# Patient Record
Sex: FEMALE | Race: WHITE | Employment: FULL TIME | URBAN - METROPOLITAN AREA
[De-identification: names, ages, dates, MRNs, and addresses within clinical notes are randomized per-mention and may not be internally consistent; named-entity substitution may affect disease eponyms.]

---

## 2016-08-02 LAB
CHLAMYDIA, EXTERNAL: NORMAL
HBSAG, EXTERNAL: NORMAL
HIV, EXTERNAL: NON REACTIVE
N. GONORRHEA, EXTERNAL: NORMAL
RUBELLA, EXTERNAL: NORMAL
TYPE, ABO & RH, EXTERNAL: NORMAL

## 2016-12-19 LAB
ANTIBODY SCREEN, EXTERNAL: NORMAL
T. PALLIDUM, EXTERNAL: NORMAL

## 2017-02-13 LAB — GRBS, EXTERNAL: NORMAL

## 2017-03-08 ENCOUNTER — HOSPITAL ENCOUNTER (INPATIENT)
Age: 32
LOS: 2 days | Discharge: HOME | End: 2017-03-10
Attending: OBSTETRICS & GYNECOLOGY | Admitting: OBSTETRICS & GYNECOLOGY
Payer: COMMERCIAL

## 2017-03-08 PROBLEM — Z34.90 PREGNANCY: Status: ACTIVE | Noted: 2017-03-08

## 2017-03-08 LAB
ERYTHROCYTE [DISTWIDTH] IN BLOOD BY AUTOMATED COUNT: 12.9 % (ref 11.5–14.5)
HCT VFR BLD AUTO: 39 % (ref 35–47)
HGB BLD-MCNC: 13.7 G/DL (ref 11.5–16)
MCH RBC QN AUTO: 30.2 PG (ref 26–34)
MCHC RBC AUTO-ENTMCNC: 35.1 G/DL (ref 30–36.5)
MCV RBC AUTO: 86.1 FL (ref 80–99)
PLATELET # BLD AUTO: 188 K/UL (ref 150–400)
RBC # BLD AUTO: 4.53 M/UL (ref 3.8–5.2)
WBC # BLD AUTO: 15.8 K/UL (ref 3.6–11)

## 2017-03-08 PROCEDURE — 74011250636 HC RX REV CODE- 250/636: Performed by: OBSTETRICS & GYNECOLOGY

## 2017-03-08 PROCEDURE — 75410000003 HC RECOV DEL/VAG/CSECN EA 0.5 HR

## 2017-03-08 PROCEDURE — 75410000000 HC DELIVERY VAGINAL/SINGLE

## 2017-03-08 PROCEDURE — 0UQGXZZ REPAIR VAGINA, EXTERNAL APPROACH: ICD-10-PCS | Performed by: OBSTETRICS & GYNECOLOGY

## 2017-03-08 PROCEDURE — 75410000002 HC LABOR FEE PER 1 HR

## 2017-03-08 PROCEDURE — 36415 COLL VENOUS BLD VENIPUNCTURE: CPT | Performed by: OBSTETRICS & GYNECOLOGY

## 2017-03-08 PROCEDURE — 0UQMXZZ REPAIR VULVA, EXTERNAL APPROACH: ICD-10-PCS | Performed by: OBSTETRICS & GYNECOLOGY

## 2017-03-08 PROCEDURE — 65410000002 HC RM PRIVATE OB

## 2017-03-08 PROCEDURE — 85027 COMPLETE CBC AUTOMATED: CPT | Performed by: OBSTETRICS & GYNECOLOGY

## 2017-03-08 PROCEDURE — 74011250637 HC RX REV CODE- 250/637: Performed by: OBSTETRICS & GYNECOLOGY

## 2017-03-08 PROCEDURE — 4A0HXCZ MEASUREMENT OF PRODUCTS OF CONCEPTION, CARDIAC RATE, EXTERNAL APPROACH: ICD-10-PCS | Performed by: OBSTETRICS & GYNECOLOGY

## 2017-03-08 PROCEDURE — 74011250636 HC RX REV CODE- 250/636

## 2017-03-08 PROCEDURE — 74011000258 HC RX REV CODE- 258: Performed by: OBSTETRICS & GYNECOLOGY

## 2017-03-08 RX ORDER — IBUPROFEN 400 MG/1
800 TABLET ORAL EVERY 8 HOURS
Status: DISCONTINUED | OUTPATIENT
Start: 2017-03-08 | End: 2017-03-10 | Stop reason: HOSPADM

## 2017-03-08 RX ORDER — ACETAMINOPHEN 325 MG/1
650 TABLET ORAL
Status: DISCONTINUED | OUTPATIENT
Start: 2017-03-08 | End: 2017-03-10 | Stop reason: HOSPADM

## 2017-03-08 RX ORDER — HYDROCORTISONE ACETATE PRAMOXINE HCL 2.5; 1 G/100G; G/100G
CREAM TOPICAL AS NEEDED
Status: DISCONTINUED | OUTPATIENT
Start: 2017-03-08 | End: 2017-03-10 | Stop reason: HOSPADM

## 2017-03-08 RX ORDER — SODIUM CHLORIDE, SODIUM LACTATE, POTASSIUM CHLORIDE, CALCIUM CHLORIDE 600; 310; 30; 20 MG/100ML; MG/100ML; MG/100ML; MG/100ML
125 INJECTION, SOLUTION INTRAVENOUS CONTINUOUS
Status: DISCONTINUED | OUTPATIENT
Start: 2017-03-08 | End: 2017-03-09

## 2017-03-08 RX ORDER — OXYTOCIN/RINGER'S LACTATE 20/1000 ML
125-500 PLASTIC BAG, INJECTION (ML) INTRAVENOUS ONCE
Status: ACTIVE | OUTPATIENT
Start: 2017-03-08 | End: 2017-03-09

## 2017-03-08 RX ORDER — NALOXONE HYDROCHLORIDE 0.4 MG/ML
0.4 INJECTION, SOLUTION INTRAMUSCULAR; INTRAVENOUS; SUBCUTANEOUS AS NEEDED
Status: DISCONTINUED | OUTPATIENT
Start: 2017-03-08 | End: 2017-03-10 | Stop reason: HOSPADM

## 2017-03-08 RX ORDER — OXYTOCIN/RINGER'S LACTATE 20/1000 ML
PLASTIC BAG, INJECTION (ML) INTRAVENOUS
Status: COMPLETED
Start: 2017-03-08 | End: 2017-03-08

## 2017-03-08 RX ORDER — OXYCODONE AND ACETAMINOPHEN 5; 325 MG/1; MG/1
1 TABLET ORAL
Status: DISCONTINUED | OUTPATIENT
Start: 2017-03-08 | End: 2017-03-10 | Stop reason: HOSPADM

## 2017-03-08 RX ORDER — LIDOCAINE HYDROCHLORIDE 10 MG/ML
INJECTION, SOLUTION EPIDURAL; INFILTRATION; INTRACAUDAL; PERINEURAL
Status: DISPENSED
Start: 2017-03-08 | End: 2017-03-09

## 2017-03-08 RX ORDER — PENICILLIN G POTASSIUM 5000000 [IU]/1
INJECTION, POWDER, FOR SOLUTION INTRAMUSCULAR; INTRAVENOUS
Status: DISPENSED
Start: 2017-03-08 | End: 2017-03-08

## 2017-03-08 RX ADMIN — IBUPROFEN 800 MG: 400 TABLET, FILM COATED ORAL at 21:42

## 2017-03-08 RX ADMIN — IBUPROFEN 800 MG: 400 TABLET, FILM COATED ORAL at 13:39

## 2017-03-08 RX ADMIN — SODIUM CHLORIDE, SODIUM LACTATE, POTASSIUM CHLORIDE, AND CALCIUM CHLORIDE 125 ML/HR: 600; 310; 30; 20 INJECTION, SOLUTION INTRAVENOUS at 09:21

## 2017-03-08 RX ADMIN — SODIUM CHLORIDE 5 MILLION UNITS: 900 INJECTION, SOLUTION INTRAVENOUS at 09:21

## 2017-03-08 RX ADMIN — Medication: at 11:58

## 2017-03-08 NOTE — IP AVS SNAPSHOT
[address] [facility] 
[phone] Patient: Kinjal Ruiz MRN: [id number] :1985 You are allergic to the following Allergen Reactions Fruit Flavor Unknown (comments) Immunizations Administered for This Admission Name Date MMR 3/10/2017 Recent Documentation Height Weight Breastfeeding? BMI OB Status Smoking Status 1.753 m 85.3 kg Yes 27.76 kg/m2 Recent pregnancy Never Smoker Unresulted Labs Order Current Status SAMPLE TO BLOOD BANK In process Emergency Contacts Name Discharge Info Relation Home Work Mobile AustinYe DISCHARGE CAREGIVER [3] Spouse [3] [phone] About your hospitalization You were admitted on:  2017 You last received care in the:  [facility] You were discharged on:  March 10, 2017 Unit phone number:  [phone] Why you were hospitalized Your primary diagnosis was:  Not on File Your diagnoses also included:  Pregnancy Providers Seen During Your Hospitalizations Provider Role Specialty Primary office phone Akua Viveros MD Attending Provider Obstetrics & Gynecology [phone] Your Primary Care Physician (PCP) Primary Care Physician Office Phone Office Fax NONE ** None ** ** None ** Follow-up Information Follow up With Details Comments Contact Info None   None (395) Patient stated that they have no PCP Akua Viveros MD Go in 6 weeks for postpartum follow up  [address] [facility] 
[phone] Current Discharge Medication List  
  
CONTINUE these medications which have NOT CHANGED Dose & Instructions Dispensing Information Comments Morning Noon Evening Bedtime Cetirizine 10 mg Cap Your next dose is: Today, Tomorrow Other:  _________ Take  by mouth. Refills:  0  
     
   
   
   
  
 ibuprofen 600 mg tablet Commonly known as:  MOTRIN Your next dose is: Today, Tomorrow Other:  _________ Dose:  600 mg Take 1 Tab by mouth every eight (8) hours as needed for Pain. Quantity:  20 Tab Refills:  0 PRENATAL DHA+COMPLETE PRENATAL -300 mg-mcg-mg Cmpk Generic drug:  PNV no.24-iron-folic acid-dha Your next dose is: Today, Tomorrow Other:  _________ Take  by mouth. Refills:  0 STOP taking these medications HYDROcodone-acetaminophen 5-325 mg per tablet Commonly known as:  Nahid Larry Discharge Instructions POST DELIVERY DISCHARGE INSTRUCTIONS Name: Paloma Kelsey YOB: 1985 Primary Diagnosis: Active Problems: * No active hospital problems. * General:  
 
Diet/Diet Restrictions: 
· Eight 8-ounce glasses of fluid daily (water, juices); avoid excessive caffeine intake. · Meals/snacks as desired which are high in fiber and carbohydrates and low in fat and cholesterol. Medications:  
 
 
 
Physical Activity / Restrictions / Safety: · Avoid heavy lifting, no more that 8 lbs. For 2-3 weeks;  
· Limit use of stairs to 2 times daily for the first week home. · No driving for one week. · Avoid intercourse 4-6 weeks, no douching or tampon use. · Check with obstetrician before starting or resuming an exercise program.   
 
Discharge Instructions/Special Treatment/Home Care Needs:  
 
· Continue prenatal vitamins. · Continue to use squirt bottle with warm water on your episiotomy after each bathroom use until bleeding stops. Call your doctor for the following: · Fever over 101 degrees by mouth. · Vaginal bleeding heavier than a normal menstrual period or clots larger than a golf ball.  
· Red streaks or increased swelling of legs, painful red streaks on your breast. 
 · Painful urination, constipation and increased pain · If you feel extremely anxious or overwhelmed. · If you have thoughts of harming yourself and/or your baby. Pain Management:  
 
· Take Acetaminophen (Tylenol) or Ibuprofen (Advil, Motrin), as directed for pain. · Use a warm Sitz bath 3 times daily to relieve episiotomy or hemorrhoidal discomfort. · For hemorrhoidal discomfort, use Tucks and Anusol cream as needed and directed. Follow-Up Care:  
 
Appointment with MD: Follow-up Appointments Procedures  FOLLOW UP VISIT Appointment in: 6 Weeks Standing Status:   Standing Number of Occurrences:   1 Order Specific Question:   Appointment in Answer:   6 Weeks Telephone number: [phone] Signed By: Bonifacio Klein MD                                                                                                   Date: 3/10/2017 Time: 10:20 AM 
 
 
Discharge Orders None zoidu Announcement We are excited to announce that we are making your provider's discharge notes available to you in zoidu. You will see these notes when they are completed and signed by the physician that discharged you from your recent hospital stay. If you have any questions or concerns about any information you see in zoidu, please call the Health Information Department where you were seen or reach out to your Primary Care Provider for more information about your plan of care. Introducing [facility]! Sophia Kay introduces zoidu patient portal. Now you can access parts of your medical record, email your doctor's office, and request medication refills online. 1. In your internet browser, go to [email or web address] 2. Click on the First Time User? Click Here link in the Sign In box. You will see the New Member Sign Up page. 3. Enter your zoidu Access Code exactly as it appears below.  You will not need to use this code after youve completed the sign-up process. If you do not sign up before the expiration date, you must request a new code. · [facility] Access Code: [email or web address] Expires: 4/25/2017 10:09 AM 
 
4. Enter the last four digits of your Social Security Number (xxxx) and Date of Birth (mm/dd/yyyy) as indicated and click Submit. You will be taken to the next sign-up page. 5. Create a [facility] ID. This will be your [facility] login ID and cannot be changed, so think of one that is secure and easy to remember. 6. Create a [facility] password. You can change your password at any time. 7. Enter your Password Reset Question and Answer. This can be used at a later time if you forget your password. 8. Enter your e-mail address. You will receive e-mail notification when new information is available in [address] 9. Click Sign Up. You can now view and download portions of your medical record. 10. Click the Download Summary menu link to download a portable copy of your medical information. If you have questions, please visit the Frequently Asked Questions section of the [facility] website. Remember, [facility] is NOT to be used for urgent needs. For medical emergencies, dial 911. Now available from your iPhone and Android! General Information Please provide this summary of care documentation to your next provider. Patient Signature:  ____________________________________________________________ Date:  ____________________________________________________________  
  
Brooke Polo Provider Signature:  ____________________________________________________________ Date:  ____________________________________________________________

## 2017-03-08 NOTE — LACTATION NOTE
This note was copied from a baby's chart. INfant fed after birth at 80 and attempted again at 0. Mom resting now and infant going to nursery for exam. Encouraged responding to feeding cues and well as waking every 2 to 3 hours if sleepy. Parents attended breastfeeding class. Initiated breastfeeding log and explained to parents. Encouraged asking for assistance as needed.     1650. Infant went to breast for sleepy attempt. Tried both cradle and football positions. Infant got into deep latch in football position but too sleepy to suckle. Parents now resting and will try again at next fed. Pump set up in room if needed for sleepiness. Infant went to breast at 1900 but very sleepy and had extra large emesis of mucus and colsotrum. Mom pumped one cc which infant took after settles but too sleepy to go to breast. LATCh score was 6. Encouraged pumping prior to next feed and having assitance from nurse. Let Cheikh Warner Rn know about emesis and need for observation for spittiness. Parents have bulb syringe in crib and understand to call emergency bell if infant gagging and choking. Infant ot go to nursery if parents sleeping and discussed that with Fidel Beckett RN. Infant now resting with Mom .

## 2017-03-08 NOTE — PROGRESS NOTES
6672: Dr. Cami Romano in room, SVE done.  Dr. Cami Romano aware of PAC's.       03/08/17 8434   Cervical Exam   Dilation (cm) 4   Eff 100 %   Station -1

## 2017-03-09 PROBLEM — Z34.90 PREGNANCY: Status: RESOLVED | Noted: 2017-03-08 | Resolved: 2017-03-09

## 2017-03-09 PROCEDURE — 74011250637 HC RX REV CODE- 250/637: Performed by: OBSTETRICS & GYNECOLOGY

## 2017-03-09 PROCEDURE — 65410000002 HC RM PRIVATE OB

## 2017-03-09 RX ADMIN — IBUPROFEN 800 MG: 400 TABLET, FILM COATED ORAL at 21:36

## 2017-03-09 RX ADMIN — IBUPROFEN 800 MG: 400 TABLET, FILM COATED ORAL at 06:48

## 2017-03-09 RX ADMIN — IBUPROFEN 800 MG: 400 TABLET, FILM COATED ORAL at 14:06

## 2017-03-09 NOTE — PROGRESS NOTES
Post-Partum Day Number 1 Progress Note    Caitlyn Avila     Assessment: Doing well, post partum day 1    Plan:  1. Continue routine postpartum and perineal care as well as maternal education. 2. N/A     Information for the patient's :  Salas Ontiveros [[id number]]   Vaginal, Spontaneous Delivery   Patient doing well without significant complaint. Voiding without difficulty, normal lochia. Vitals:  Visit Vitals    /72 (BP 1 Location: Right arm, BP Patient Position: Sitting)    Pulse 72    Temp 97.9 °F (36.6 °C)    Resp 16    Ht 5' 9\" (1.753 m)    Wt 85.3 kg (188 lb)    LMP 2016 (Exact Date)    SpO2 99%    Breastfeeding Yes    BMI 27.76 kg/m2     Temp (24hrs), Av.3 °F (36.8 °C), Min:97.9 °F (36.6 °C), Max:98.8 °F (37.1 °C)        Exam:   Patient without distress. Abdomen soft, fundus firm, nontender                Perineum with normal lochia noted. Lower extremities are negative for swelling, cords or tenderness.     Labs:     Lab Results   Component Value Date/Time    WBC 15.8 2017 09:20 AM    HGB 13.7 2017 09:20 AM    HCT 39.0 2017 09:20 AM    PLATELET 293  09:20 AM       Recent Results (from the past 24 hour(s))   CBC W/O DIFF    Collection Time: 17  9:20 AM   Result Value Ref Range    WBC 15.8 (H) 3.6 - 11.0 K/uL    RBC 4.53 3.80 - 5.20 M/uL    HGB 13.7 11.5 - 16.0 g/dL    HCT 39.0 35.0 - 47.0 %    MCV 86.1 80.0 - 99.0 FL    MCH 30.2 26.0 - 34.0 PG    MCHC 35.1 30.0 - 36.5 g/dL    RDW 12.9 11.5 - 14.5 %    PLATELET 035 265 - 287 K/uL

## 2017-03-09 NOTE — PROGRESS NOTES
Bedside and Verbal shift change report given to SEPIDEH Whittington (oncoming nurse) by Sean Giordano RN (offgoing nurse). Report included the following information SBAR, Intake/Output and MAR.

## 2017-03-09 NOTE — LACTATION NOTE
This note was copied from a baby's chart. Infant  5 times for short feeds. Initial feed was 15 minutes followed by a sleepy feeds of 2 minutes. Infant also has 4 attempts at feeding but too sleepy to wake. Mom is pumping and got 1 ml and 3 ml's. Two LATCH scores of 6 and 7 were noted. Weight loss is 5%. Plan is to continue pumping and waking and giving EBM obtained. Infant voided twice and stooled 5 times since birth in 22 hours. Infant went to breast at  for 25 minutes. Woke well with deeper latching. Encouraged continued pumping for sleepiness and 5% weight loss after sleepiness of first day.

## 2017-03-09 NOTE — ROUTINE PROCESS
Bedside shift change report given to MOON Perez RN (oncoming nurse) by SEPIDEH Auguste RN (offgoing nurse). Report included the following information SBAR.

## 2017-03-09 NOTE — L&D DELIVERY NOTE
Delivery Summary  Patient: Sean Quintana             Circumcision:   NA-female  Additional Delivery Comments - . Lidocaine injected into perineum during pushing and additional for repair. Information for the patient's :  Lio Bentley [[id number]]       Labor Events:    Labor: No   Rupture Date: 3/8/2017   Rupture Time: 7:30 AM   Rupture Type SROM   Amniotic Fluid Volume:  Moderate    Amniotic Fluid Description: Clear     Induction: None       Augmentation: None   Labor Events: None     Cervical Ripening:     None     Delivery Events:  Episiotomy: None   Laceration(s): Vaginal;Right periurethral     Repaired: Yes    Number of Repair Packets: 1   Suture Type and Size: Vicryl 3-0     Estimated Blood Loss (ml): 200ml       Delivery Date: 3/8/2017    Delivery Time: 11:53 AM  Delivery Type: Vaginal, Spontaneous Delivery  Sex:  Female     Gestational Age: 38w3d   Delivery Clinician:  Morgan Mejia  Living Status: Yes   Delivery Location: L&D            APGARS  One minute Five minutes Ten minutes   Skin color: 1   1        Heart rate: 2   2        Grimace: 2   2        Muscle tone: 2   2        Breathin   2        Totals: 9   9            Presentation: Vertex    Position:        Resuscitation Method:  None     Meconium Stained: None      Cord Vessels: 3 Vessels      Cord Events:    Cord Blood Sent?:  No    Blood Gases Sent?:  No    Placenta:  Date/Time: 3/8 11:58 AM  Removal: Spontaneous      Appearance: Intact      Measurements:  Birth Weight: 2.995 kg      Birth Length: 52.1 cm      Head Circumference: 33.5 cm      Chest Circumference: 29 cm     Abdominal Girth: 30.5 cm    Other Providers:   CANDICE SMITH;AMANDA PIERSON;BOB TARIQ;;;;;;;;MELANY GIFFORD;GEORGETTE PENA, Obstetrician;Primary Nurse;Primary  Nurse;Nicu Nurse;Neonatologist;Anesthesiologist;Crna;Nurse Practitioner;Midwife;Nursery Nurse           Cord pH:  none    Episiotomy: None Laceration(s): Vaginal;Right periurethral      Estimated Blood Loss (ml): 200    Labor Events  Method: None       Augmentation: None   Cervical Ripening:       None        Operative Vaginal Delivery - none    Group B Strep:   Lab Results   Component Value Date/Time    GrBStrep, External pos 2017     Information for the patient's :  Nicola Gamino [[id number]]   No results found for: ABORH, PCTABR, PCTDIG, BILI, ABORHEXT, ABORH    No results found for: APH, APCO2, APO2, AHCO3, ABEC, ABDC, O2ST, EPHV, PCO2V, PO2V, HCO3V, EBEV, EBDV, SITE, RSCOM

## 2017-03-09 NOTE — PROGRESS NOTES
Spiritual Care Assessment/Progress Notes    Mariel Berumen [id number]  [id number]    1985  36 y.o.  female    Patient Telephone Number: [phone] (home)   Latter day Affiliation: Kuldeep Almodovar   Language: English   Extended Emergency Contact Information  Primary Emergency Contact: [address] Phone: [phone]  Relation: Spouse   There are no active problems to display for this patient. Date: 3/9/2017       Level of Latter day/Spiritual Activity:  [x]         Involved in christine tradition/spiritual practice    []         Not involved in christine tradition/spiritual practice  [x]         Spiritually oriented    []         Claims no spiritual orientation    []         seeking spiritual identity  []         Feels alienated from Congregation practice/tradition  []         Feels angry about Congregation practice/tradition  [x]         Spirituality/Congregation tradition is a resource for coping at this time.   []         Not able to assess due to medical condition    Services Provided Today:  []         crisis intervention    []         reading Scriptures  [x]         spiritual assessment    [x]         prayer  []         empathic listening/emotional support  []         rites and rituals (cite in comments)  []         life review     []         Congregation support  []         theological development   []         advocacy  []         ethical dialog     []         blessing  []         bereavement support    [x]         support to family  []         anticipatory grief support   []         help with AMD  []         spiritual guidance    []         meditation      Spiritual Care Needs  []         Emotional Support  []         Spiritual/Latter day Care  []         Loss/Adjustment  []         Advocacy/Referral                /Ethics  [x]         No needs expressed at               this time  []         Other: (note in               comments)  [address]  []         Follow up visits with pt/family  []         Provide materials  []         Schedule sacraments  []         Contact Community               Clergy  [x]         Follow up as needed  []         Other: (note in               comments)     Comments:  Initial visit on Mother/Infant for spiritual assessment. Patient's  was present. Offered congratulations on their firstborn \"Christina Martin\". Patient and her  are members of [facility]. Their  is expected to visit some time today. They have good support. No needs or concerns were expressed. Offered assurance of prayer.   MARAH Tompkins, [facility], [address] [place] [address] [address]     Paging Service  [phone])

## 2017-03-10 VITALS
RESPIRATION RATE: 16 BRPM | DIASTOLIC BLOOD PRESSURE: 65 MMHG | TEMPERATURE: 98.2 F | WEIGHT: 188 LBS | HEART RATE: 72 BPM | SYSTOLIC BLOOD PRESSURE: 108 MMHG | HEIGHT: 69 IN | BODY MASS INDEX: 27.85 KG/M2 | OXYGEN SATURATION: 99 %

## 2017-03-10 PROCEDURE — 74011250637 HC RX REV CODE- 250/637: Performed by: OBSTETRICS & GYNECOLOGY

## 2017-03-10 PROCEDURE — 74011250636 HC RX REV CODE- 250/636: Performed by: OBSTETRICS & GYNECOLOGY

## 2017-03-10 PROCEDURE — 90707 MMR VACCINE SC: CPT | Performed by: OBSTETRICS & GYNECOLOGY

## 2017-03-10 RX ADMIN — MEASLES, MUMPS, AND RUBELLA VIRUS VACCINE LIVE 0.5 ML: 1000; 12500; 1000 INJECTION, POWDER, LYOPHILIZED, FOR SUSPENSION SUBCUTANEOUS at 11:49

## 2017-03-10 RX ADMIN — IBUPROFEN 800 MG: 400 TABLET, FILM COATED ORAL at 07:29

## 2017-03-10 NOTE — DISCHARGE SUMMARY
Obstetrical Discharge Summary     Name: Paloma Kelsey MRN: [id number]  SSN: [id number]    YOB: 1985  Age: 36 y.o. Sex: female      Admit Date: 3/8/2017    Discharge Date: 3/10/2017     Admitting Physician: Bunny Tobin MD     Attending Physician:  Yehuda Moreno MD     Admission Diagnoses: labor  Pregnancy    Discharge Diagnoses:   Information for the patient's :  Tana Causey [[id number]]   Delivery of a 2.995 kg female infant via Vaginal, Spontaneous Delivery on 3/8/2017 at 11:53 AM  by . Apgars were 9 and 9. Female - Christina    Additional Diagnoses:   Hospital Problems  Date Reviewed: 3/10/2017    None         Lab Results   Component Value Date/Time    Rubella, External non immune 2016    GrBStrep, External pos 2017       Hospital Course: Normal hospital course following the delivery. Disposition at Discharge: Home or self care    Discharged Condition: Stable    Patient Instructions:   Current Discharge Medication List      CONTINUE these medications which have NOT CHANGED    Details   PNV no.24-iron-folic acid-dha (PRENATAL DHA+COMPLETE PRENATAL) -300 mg-mcg-mg cmpk Take  by mouth. ibuprofen (MOTRIN) 600 mg tablet Take 1 Tab by mouth every eight (8) hours as needed for Pain. Qty: 20 Tab, Refills: 0      Cetirizine 10 mg cap Take  by mouth. STOP taking these medications       HYDROcodone-acetaminophen (NORCO) 5-325 mg per tablet Comments:   Reason for Stopping:               Reference my discharge instructions.     Follow-up Appointments   Procedures    FOLLOW UP VISIT Appointment in: 6 Weeks     Standing Status:   Standing     Number of Occurrences:   1     Order Specific Question:   Appointment in     Answer:   6 Weeks        Signed By:  Abram Keenan MD     March 10, 2017

## 2017-03-10 NOTE — ROUTINE PROCESS
Bedside, Verbal, Recorded and Written shift change report given to APOLONIA Bryant RN (oncoming nurse) by CORTNEY Morales RN (offgoing nurse). Report included the following information SBAR, Kardex, Procedure Summary, Intake/Output, MAR, Recent Results and Med Rec Status.

## 2017-03-10 NOTE — PROGRESS NOTES
Post-Partum Day Number 2 Progress Note    Caitlyn Avila     Assessment: Doing well, post partum day 2    Plan:   1. Discharge home today  2. Follow up in office in 6 weeks with Zaina Faustin MD  3. Post partum activity advised, diet as tolerated  4. Discharge Medications: OTC ibuprofen and medications prior to admission    Information for the patient's :  Loraine Spring [[id number]]   Vaginal, Spontaneous Delivery   Patient doing well without significant complaint. Voiding without difficulty, normal lochia. Vitals:  Visit Vitals    /65 (BP 1 Location: Right arm)    Pulse 72    Temp 98.2 °F (36.8 °C)    Resp 16    Ht 5' 9\" (1.753 m)    Wt 85.3 kg (188 lb)    LMP 2016 (Exact Date)    SpO2 99%    Breastfeeding Yes    BMI 27.76 kg/m2     Temp (24hrs), Av °F (36.7 °C), Min:97.7 °F (36.5 °C), Max:98.4 °F (36.9 °C)      Exam:         Patient without distress. Abdomen soft, fundus firm, nontender                 Lower extremities are negative for swelling, cords or tenderness. Labs:     Lab Results   Component Value Date/Time    WBC 15.8 2017 09:20 AM    HGB 13.7 2017 09:20 AM    HCT 39.0 2017 09:20 AM    PLATELET 938  09:20 AM       No results found for this or any previous visit (from the past 24 hour(s)).

## 2017-03-10 NOTE — DISCHARGE INSTRUCTIONS
POST DELIVERY DISCHARGE INSTRUCTIONS    Name: Gary Reynolds  YOB: 1985  Primary Diagnosis: Active Problems:    * No active hospital problems. *      General:     Diet/Diet Restrictions:  · Eight 8-ounce glasses of fluid daily (water, juices); avoid excessive caffeine intake. · Meals/snacks as desired which are high in fiber and carbohydrates and low in fat and cholesterol. Medications:         Physical Activity / Restrictions / Safety:     · Avoid heavy lifting, no more that 8 lbs. For 2-3 weeks;   · Limit use of stairs to 2 times daily for the first week home. · No driving for one week. · Avoid intercourse 4-6 weeks, no douching or tampon use. · Check with obstetrician before starting or resuming an exercise program.      Discharge Instructions/Special Treatment/Home Care Needs:     · Continue prenatal vitamins. · Continue to use squirt bottle with warm water on your episiotomy after each bathroom use until bleeding stops. Call your doctor for the following:     · Fever over 101 degrees by mouth. · Vaginal bleeding heavier than a normal menstrual period or clots larger than a golf ball. · Red streaks or increased swelling of legs, painful red streaks on your breast.  · Painful urination, constipation and increased pain  · If you feel extremely anxious or overwhelmed. · If you have thoughts of harming yourself and/or your baby. Pain Management:     · Take Acetaminophen (Tylenol) or Ibuprofen (Advil, Motrin), as directed for pain. · Use a warm Sitz bath 3 times daily to relieve episiotomy or hemorrhoidal discomfort. · For hemorrhoidal discomfort, use Tucks and Anusol cream as needed and directed.       Follow-Up Care:     Appointment with MD:   Follow-up Appointments   Procedures    FOLLOW UP VISIT Appointment in: 6 Weeks     Standing Status:   Standing     Number of Occurrences:   1     Order Specific Question:   Appointment in     Answer:   6 Weeks     Telephone number: [phone]    Signed By: Celina Reyez MD                                                                                                   Date: 3/10/2017 Time: 10:20 AM

## 2017-03-10 NOTE — ROUTINE PROCESS
Bedside shift change report given to MOON German (oncoming nurse) by John Betancourt (offgoing nurse). Report included the following information SBAR.

## 2017-03-10 NOTE — PROGRESS NOTES
Mom discharge to home. Teaching and care plan completed. D/C instructions discussed and signed. Pt verbalizes understanding of teaching and instructions. Copy of signed D/C instructions on paper chart and one copy given to pt for home.

## 2017-03-15 NOTE — H&P
EDC:2017  EGA: 39 weeks, 3 days      History of Present Illness:  37 yo G1 at 44 3/7 wks with rom at 0730 and increased ctx. preg complicated by fetal PACs. Normal heart anatomy. Patient's Prenatal Care with Doctor of Record Carmita Enriquez MD Notable For -    Fetal PACs  GBS positive RX in labor   lab screening  Normal pregnancy primigravida          Impression & Recommendations:    Problem # 1:  Fetal PACs (ICD-659.73) (SHS18-C15)  Noted on monitor. Ty let peds know. Continuous monitoring through labor. Problem # 2:  GBS positive RX in labor (VNZ-509.73) (VQV78-M53.82)  noted. pcn started. Problem # 3:  Normal pregnancy primigravida (ICD-V22.0) (BRS33-Q12.70)  active labor with spont rom. desires unmedicated birth. expectant management. Allergies      Medications Removed from Medication List        Flowsheet View for Follow-up Visit     Estimated weeks of        gestation:  39 3/7     Fetal position:  vertex     Cx Dilation:  4cm     Cx Effacement: 90%     Cx Station:  -1      Vital Signs      FHT Descrip:    reassuring       - Additional FHT Comments: pacs 10-30 sec  Contractions:  yes  UC Frequency:  q 3 min          Physical Exam     General           General appearance:  no acute distress    Psych           Orientation:  oriented to time, place, and person          Mood:  no appearance of anxiety, depression, or agitation    Abdomen           Abdomen:  gravid    Pelvic Exam           EGBUS:  no lesions                  Dilation: : 4cm                  Effacement:  90%                  Station:  -1                  Presentation:  vertex                  Membranes:  ruptured            Impression & Recommendations:    Problem # 1:  Fetal PACs (ICD-659.73) (RCZ35-N06)  Noted on monitor. Ty let peds know. Continuous monitoring through labor. Problem # 2:  GBS positive RX in labor (XET-560.34) (PNW07-Z50.82)  noted. pcn started.     Problem # 3: Normal pregnancy primigravida (ICD-V22.0) (QND79-S85.52)  active labor with spont rom. desires unmedicated birth. expectant management. Medications (at conclusion of this visit)    01/03/2017 BREAST PUMP MISC (MISC. DEVICES) double electric breat pump diagnosis normal breast feeding  08/02/2016 PRENATAL FORMULA TABS (PRENATAL VIT-FE FUMARATE-FA TABS) otc          LABORATORY DATA   TEST DATE RESULT   Group B Strep culture 02/13/2017 Positive                                   (Group B Strep Culture Result Field)   Blood Type 08/02/2016 A                                             (Blood Type Result Field)   Rh 08/02/2016 Positive                                   (Rh Result Field)   Rhogam Inj Given     Tdap Vaccine Given 12/19/2016 Vacc. [address]   Antibody Screen 12/19/2016 Negative   Rubella  [facility] Reference Ranges On or After 3/10/14                  <0.90              Non-immune      0.90 - 0.99     Equivocal      >0.99              Immune    [facility] Reference Ranges  Before 3/10/14           <5                 Non-immune             5 - 9               Equivocal            >9                 Immune  [facility] Reference Ranges       < Or = 0.90       Negative             0.91-1.09          Equivocal            > Or = 1.10       Positive   08/02/2016     <0.90     TPA (T Pallidum Antibodies) 12/19/2016 Negative   Serology (RPR)     HBsAg 08/02/2016 Negative   HIV 08/02/2016 Non Reactive   Hemoglobin 12/19/2016 11.8   Hematocrit 12/19/2016 34.6   Platelets 58/57/8909 198 X10E3/UL   TSH     Urine Culture 08/02/2016 Negative   GC DNA Probe 08/02/2016 Negative   Chlamydia DNA 08/02/2016 Negative   PAP 08/02/2016 NIL   Flu Vaccine Given 10/26/2016 Vacc.  [address]   HGBA1C     HGB Electro     T4, Free     BG Fasting     GTT 1H 50G 12/19/2016 53   GTT 1H 100G     GTT 2H 100G     GTT 3H 100G     Glucose Plasma     CF Accept or Decline 08/02/2016 declined   CF Screen Result 08/02/2016 Declined   Nuchal Trans 08/02/2016 declined AFP Only     Tetra 08/30/2016 Declined   AFP Serum     CVS     AFP Amniotic     Amnio Karyo 08/02/2016 declined   FISH     GC Culture     Chlamydia Cult     Ureaplasma     Mycoplasma     WBC 08/02/2016 7.1 X10E3/UL   RBC 08/02/2016 4.55 X10E6/UL   MCV 08/02/2016 86   MCH 08/02/2016 29.2   MCHC RBC 08/02/2016 34.2     ULTRASOUND DATA   TEST DATE RESULT   Estimated Fetal Weight 02/21/2017 9526^9312 g&grams                                     Weight % 02/21/2017 27^27% %&percent                                                KERRY 03/07/2017 14.3^14.3 cm&centimeters                    BPP 03/07/2017 8^8 [n/a]&Not applicable   Cervical Length (mm)               ________________________________________________________________________

## 2018-06-20 LAB
ANTIBODY SCREEN, EXTERNAL: NEGATIVE
CHLAMYDIA, EXTERNAL: NEGATIVE
HBSAG, EXTERNAL: NEGATIVE
HIV, EXTERNAL: NON REACTIVE
N. GONORRHEA, EXTERNAL: NEGATIVE
RUBELLA, EXTERNAL: NORMAL
T. PALLIDUM, EXTERNAL: NEGATIVE

## 2018-12-26 LAB — GRBS, EXTERNAL: NEGATIVE

## 2019-01-22 ENCOUNTER — HOSPITAL ENCOUNTER (INPATIENT)
Age: 34
LOS: 2 days | Discharge: HOME | End: 2019-01-24
Attending: OBSTETRICS & GYNECOLOGY | Admitting: OBSTETRICS & GYNECOLOGY
Payer: COMMERCIAL

## 2019-01-22 LAB
BASOPHILS # BLD: 0 K/UL (ref 0–0.1)
BASOPHILS NFR BLD: 0 % (ref 0–1)
DIFFERENTIAL METHOD BLD: ABNORMAL
EOSINOPHIL # BLD: 0.3 K/UL (ref 0–0.4)
EOSINOPHIL NFR BLD: 2 % (ref 0–7)
ERYTHROCYTE [DISTWIDTH] IN BLOOD BY AUTOMATED COUNT: 13.2 % (ref 11.5–14.5)
HCT VFR BLD AUTO: 36.4 % (ref 35–47)
HGB BLD-MCNC: 12.6 G/DL (ref 11.5–16)
IMM GRANULOCYTES # BLD AUTO: 0.1 K/UL (ref 0–0.04)
IMM GRANULOCYTES NFR BLD AUTO: 1 % (ref 0–0.5)
LYMPHOCYTES # BLD: 4.1 K/UL (ref 0.8–3.5)
LYMPHOCYTES NFR BLD: 32 % (ref 12–49)
MCH RBC QN AUTO: 29.4 PG (ref 26–34)
MCHC RBC AUTO-ENTMCNC: 34.6 G/DL (ref 30–36.5)
MCV RBC AUTO: 84.8 FL (ref 80–99)
MONOCYTES # BLD: 0.6 K/UL (ref 0–1)
MONOCYTES NFR BLD: 4 % (ref 5–13)
NEUTS SEG # BLD: 7.7 K/UL (ref 1.8–8)
NEUTS SEG NFR BLD: 60 % (ref 32–75)
NRBC # BLD: 0 K/UL (ref 0–0.01)
NRBC BLD-RTO: 0 PER 100 WBC
PLATELET # BLD AUTO: 183 K/UL (ref 150–400)
PMV BLD AUTO: 12.1 FL (ref 8.9–12.9)
RBC # BLD AUTO: 4.29 M/UL (ref 3.8–5.2)
WBC # BLD AUTO: 12.7 K/UL (ref 3.6–11)

## 2019-01-22 PROCEDURE — 75810000275 HC EMERGENCY DEPT VISIT NO LEVEL OF CARE

## 2019-01-22 PROCEDURE — 36415 COLL VENOUS BLD VENIPUNCTURE: CPT

## 2019-01-22 PROCEDURE — 75410000003 HC RECOV DEL/VAG/CSECN EA 0.5 HR

## 2019-01-22 PROCEDURE — 75410000000 HC DELIVERY VAGINAL/SINGLE

## 2019-01-22 PROCEDURE — 75410000002 HC LABOR FEE PER 1 HR

## 2019-01-22 PROCEDURE — 99283 EMERGENCY DEPT VISIT LOW MDM: CPT

## 2019-01-22 PROCEDURE — 59025 FETAL NON-STRESS TEST: CPT

## 2019-01-22 PROCEDURE — 65410000002 HC RM PRIVATE OB

## 2019-01-22 PROCEDURE — 85025 COMPLETE CBC W/AUTO DIFF WBC: CPT

## 2019-01-22 PROCEDURE — 74011250637 HC RX REV CODE- 250/637: Performed by: OBSTETRICS & GYNECOLOGY

## 2019-01-22 PROCEDURE — 74011250636 HC RX REV CODE- 250/636

## 2019-01-22 RX ORDER — LIDOCAINE HYDROCHLORIDE 10 MG/ML
INJECTION, SOLUTION EPIDURAL; INFILTRATION; INTRACAUDAL; PERINEURAL
Status: DISPENSED
Start: 2019-01-22 | End: 2019-01-22

## 2019-01-22 RX ORDER — DIPHENHYDRAMINE HCL 25 MG
25 CAPSULE ORAL
Status: DISCONTINUED | OUTPATIENT
Start: 2019-01-22 | End: 2019-01-24 | Stop reason: HOSPADM

## 2019-01-22 RX ORDER — SIMETHICONE 80 MG
80 TABLET,CHEWABLE ORAL
Status: DISCONTINUED | OUTPATIENT
Start: 2019-01-22 | End: 2019-01-24 | Stop reason: HOSPADM

## 2019-01-22 RX ORDER — SODIUM CHLORIDE, SODIUM LACTATE, POTASSIUM CHLORIDE, CALCIUM CHLORIDE 600; 310; 30; 20 MG/100ML; MG/100ML; MG/100ML; MG/100ML
125 INJECTION, SOLUTION INTRAVENOUS CONTINUOUS
Status: DISCONTINUED | OUTPATIENT
Start: 2019-01-22 | End: 2019-01-24 | Stop reason: HOSPADM

## 2019-01-22 RX ORDER — ZOLPIDEM TARTRATE 5 MG/1
5 TABLET ORAL
Status: DISCONTINUED | OUTPATIENT
Start: 2019-01-22 | End: 2019-01-24 | Stop reason: HOSPADM

## 2019-01-22 RX ORDER — SODIUM CHLORIDE 0.9 % (FLUSH) 0.9 %
5-40 SYRINGE (ML) INJECTION EVERY 8 HOURS
Status: DISCONTINUED | OUTPATIENT
Start: 2019-01-22 | End: 2019-01-24 | Stop reason: HOSPADM

## 2019-01-22 RX ORDER — HYDROCORTISONE ACETATE PRAMOXINE HCL 2.5; 1 G/100G; G/100G
CREAM TOPICAL AS NEEDED
Status: DISCONTINUED | OUTPATIENT
Start: 2019-01-22 | End: 2019-01-24 | Stop reason: HOSPADM

## 2019-01-22 RX ORDER — ADHESIVE BANDAGE
30 BANDAGE TOPICAL DAILY PRN
Status: DISCONTINUED | OUTPATIENT
Start: 2019-01-22 | End: 2019-01-24 | Stop reason: HOSPADM

## 2019-01-22 RX ORDER — OXYCODONE AND ACETAMINOPHEN 5; 325 MG/1; MG/1
2 TABLET ORAL
Status: DISCONTINUED | OUTPATIENT
Start: 2019-01-22 | End: 2019-01-24 | Stop reason: HOSPADM

## 2019-01-22 RX ORDER — IBUPROFEN 400 MG/1
800 TABLET ORAL EVERY 8 HOURS
Status: DISCONTINUED | OUTPATIENT
Start: 2019-01-22 | End: 2019-01-24 | Stop reason: HOSPADM

## 2019-01-22 RX ORDER — NALOXONE HYDROCHLORIDE 0.4 MG/ML
0.4 INJECTION, SOLUTION INTRAMUSCULAR; INTRAVENOUS; SUBCUTANEOUS AS NEEDED
Status: DISCONTINUED | OUTPATIENT
Start: 2019-01-22 | End: 2019-01-24 | Stop reason: HOSPADM

## 2019-01-22 RX ORDER — OXYTOCIN/RINGER'S LACTATE 20/1000 ML
PLASTIC BAG, INJECTION (ML) INTRAVENOUS
Status: COMPLETED
Start: 2019-01-22 | End: 2019-01-22

## 2019-01-22 RX ORDER — SODIUM CHLORIDE 0.9 % (FLUSH) 0.9 %
5-40 SYRINGE (ML) INJECTION AS NEEDED
Status: DISCONTINUED | OUTPATIENT
Start: 2019-01-22 | End: 2019-01-24 | Stop reason: HOSPADM

## 2019-01-22 RX ORDER — ACETAMINOPHEN 325 MG/1
650 TABLET ORAL
Status: DISCONTINUED | OUTPATIENT
Start: 2019-01-22 | End: 2019-01-24 | Stop reason: HOSPADM

## 2019-01-22 RX ORDER — OXYTOCIN/RINGER'S LACTATE 20/1000 ML
125-500 PLASTIC BAG, INJECTION (ML) INTRAVENOUS ONCE
Status: COMPLETED | OUTPATIENT
Start: 2019-01-22 | End: 2019-01-22

## 2019-01-22 RX ORDER — ONDANSETRON 4 MG/1
4 TABLET, ORALLY DISINTEGRATING ORAL
Status: ACTIVE | OUTPATIENT
Start: 2019-01-22 | End: 2019-01-23

## 2019-01-22 RX ORDER — NALBUPHINE HYDROCHLORIDE 10 MG/ML
10 INJECTION, SOLUTION INTRAMUSCULAR; INTRAVENOUS; SUBCUTANEOUS
Status: DISCONTINUED | OUTPATIENT
Start: 2019-01-22 | End: 2019-01-22 | Stop reason: HOSPADM

## 2019-01-22 RX ORDER — NALOXONE HYDROCHLORIDE 0.4 MG/ML
0.4 INJECTION, SOLUTION INTRAMUSCULAR; INTRAVENOUS; SUBCUTANEOUS AS NEEDED
Status: DISCONTINUED | OUTPATIENT
Start: 2019-01-22 | End: 2019-01-22 | Stop reason: HOSPADM

## 2019-01-22 RX ADMIN — Medication 19980 MILLI-UNITS/HR: at 03:37

## 2019-01-22 RX ADMIN — Medication 10 ML: at 06:01

## 2019-01-22 RX ADMIN — IBUPROFEN 800 MG: 400 TABLET ORAL at 06:01

## 2019-01-22 RX ADMIN — IBUPROFEN 800 MG: 400 TABLET ORAL at 13:55

## 2019-01-22 NOTE — ROUTINE PROCESS
Primary Nurse Guero Degroot, BRENDAN and Zachariah Paula RN performed a dual skin assessment on this patient No impairment noted Ede score is 23

## 2019-01-22 NOTE — LACTATION NOTE
This note was copied from a baby's chart. P2 Well read and independent mother/4 hours of age upon  [facility] introduction. 90 minute baby led 1st feeding. Now sleeping in crib. Pt will successfully establish breastfeeding by feeding in response to early feeding cues or wake every 3h, will obtain deep latch, and will keep log of feedings/output. Taught to BF at hunger cues and or q 2-3 hrs and to offer 10-20 drops of hand expressed colostrum at any non-feeds. Breast Assessment Left Breast: Small Left Nipple: Intact Right Breast: Small Right Nipple: Intact Breast- Feeding Assessment Attends Breast-Feeding Classes: No 
Breast-Feeding Experience: Yes Breast Trauma/Surgery: No 
Type/Quality: Good Lactation Consultant Visits Breast-Feedings: Good LATCH Documentation Latch: Repeated attempts, hold nipple in mouth, stimulate to suck Audible Swallowing: A few with stimulation Type of Nipple: Everted (after stimulation) Comfort (Breast/Nipple): Soft/non-tender Hold (Positioning): No assist from staff, mother able to position/hold infant LATCH Score: 8  [facility] # provided, handout with breastfeeding in hospital stay given/reviewed. Reviewed breastfeeding basics:  How milk is made and normal  breastfeeding behaviors discussed. Supply and demand,  stomach size, early feeding cues, skin to skin bonding, positioning and baby led latch-on, assymetrical latch with signs of good, deep latch vs shallow, feeding frequency and duration, and log sheet for tracking infant feedings and output. Breastfeeding Booklet and Warm line information given. Discussed typical  weight loss and the importance of infant weight checks with pediatrician 1 day post discharge. [facility]/recommend seeking NNP CLC for outpatient support as needed. No questions or concerns at this time.  [facility] # provided. Call prn.

## 2019-01-22 NOTE — L&D DELIVERY NOTE
Delivery Summary  Patient: Mary Carmen Do             Additional Delivery Comments - Patient was admitted early the morning of delivery with onset of contractions of increasing strength and frequency. On admission, she was 7cm/-2 station. Fetal heart tones were Cat 1 throughout the course of labor. The patient then progressed through the first stage, then progressed through the second stage to a vaginal delivery. When the fetal vertex approached the perineum SROM occurred, and the patient was prepared for delivery. The baby was delivered without difficulty over intact perineum, bulb suctioned, became active and crying, and was placed on the maternal abdomen. The cord was clamped and cut, and then the placenta delivered spontaneously, intact. The fundus became firm, the cervix and sulci were inspected and appeared to be intact. Vaginal exam revealed no evidence of hematoma formation or foreign bodies. Sponge and sharps count was correct. The procedure was tolerated adequately by the patient and she is recovering in stable condition.       Information for the patient's :  [place] Sanchez Pending [[id number]]     Delivery Type: Vaginal, Spontaneous   Delivery Date: 2019   Delivery Time: 3:42 AM     Birth Weight:       Sex:  child  Delivery Clinician:  Lashon Vanessa   Gestational Age: Gestational Age: 43w3d    Presentation: Vertex   Position: Right  Occiput  Anterior     Apgars were 9  and 9      Resuscitation Method:       Meconium Stained:    Living Status: Living       Placenta Date/Time: 1/15/2019  3:47 AM   Placenta Removal: Spontaneous   Placenta Appearance: Vertex [1]     Cord Information: 3 Vessels    Cord Events: None       Cord Blood Sent?:       Blood Gases Sent?:  No     Episiotomy: None   Laceration(s): Left labial     Estimated Blood Loss (ml): 300    Labor Events  Method: None      Augmentation: None   Cervical Ripening:       None

## 2019-01-22 NOTE — PROGRESS NOTES
1:05 AM 
Pt arrived ambulatory from the ED with complaints of contractions that are about every 3-6 minutes. . 39 and 4/7 weeks. Pt reports that she was 3 cm in the office on Friday. Pt denies any complications with this pregnancy. Pt denies any vaginal bleeding. Pt reports that she had some fluid leak around 9:30 pm tonight and then contractions became painful and regular around 11 pm. EFM x 2 applied. FHR found in right lower quadrant. 2:33 AM 
Dr Consuelo Lopez in with pt. SVE performed. Pt in active labor. Pt would like to lamaze. Will perform intermittent monitoring. 3:31 AM 
Pt with SROM will standing and rocking. Clear fluid noted. 3:37 AM 
Pt with continuous pressure. Pt returned to bed for exam. SVE performed by RN. Pt complete. 3:42 AM 
 of live female infant. 7:10 AM 
Bedside shift change report given to Kathryn Nova RN (oncoming nurse) by Inez Landry RN (offgoing nurse). Report included the following information SBAR, Kardex, Intake/Output, MAR and Recent Results.

## 2019-01-22 NOTE — PROGRESS NOTES
Bedside shift change report given to MOON Guevara RN (oncoming nurse) by BILL Garland (offgoing nurse). Report given with SBAR.

## 2019-01-22 NOTE — H&P
OB History & Physical 
 
Name: Leonor Pappas MRN: [id number]  SSN: [id number] YOB: 1985  Age: 34 y.o. Sex: female Subjective:  
 
Reason for Admission:  Pregnancy and contractions History of Present Illness: Leonor Pappas is a 34 y.o.   female with an estimated gestational age of 43w3d with Estimated Date of Delivery: 19. Patient complains of contractions for several hours, no gross ROM, no bleeding. Baby active. Patient denies significant problems with this pregnancy; GBS negative. OB History  Para Term  AB Living 2 1 1     1 SAB TAB Ectopic Molar Multiple Live Births 0 1 History reviewed. No pertinent past medical history. Past Surgical History:  
Procedure Laterality Date  HX ORTHOPAEDIC    
 ACL surgery   HX OTHER SURGICAL ACL surgery  Social History Occupational History  Not on file Tobacco Use  Smoking status: Never Smoker  Smokeless tobacco: Never Used Substance and Sexual Activity  Alcohol use: No  
  Frequency: Never Comment: occ  Drug use: No  
 Sexual activity: Yes  
  Partners: Male Birth control/protection: None History reviewed. No pertinent family history. Allergies Allergen Reactions  Fruit Flavor Unknown (comments) Prior to Admission medications Medication Sig Start Date End Date Taking? Authorizing Provider PNV no.24-iron-folic acid-dha (PRENATAL DHA+COMPLETE PRENATAL) -300 mg-mcg-mg cmpk Take  by mouth. Yes Provider, Historical  
ibuprofen (MOTRIN) 600 mg tablet Take 1 Tab by mouth every eight (8) hours as needed for Pain. 16   JUSTICE Ball Cetirizine 10 mg cap Take  by mouth. Other, MD Vladislav  
  
 
Review of Systems: 
General: Denies fever, sweats, chills CV: Denies CP, palpitations Resp: Denies SOB, cough GI: Denies nausea, vomiting, diarrhea : Denies dysura, abnormal frequency, hematuria Neuro: Denies HA, visual disturbance Objective:  
 
Vitals:   
Vitals:  
 19 0122 19 0125 19 0128 19 0200 BP: 127/81   141/88 Pulse: 71   66 Resp: 18 Temp: 98.6 °F (37 °C) SpO2: 100% 100%  99% Weight:   84.8 kg (187 lb) Height:   5' 9.5\" (1.765 m) Temp (24hrs), Av.6 °F (37 °C), Min:98.6 °F (37 °C), Max:98.6 °F (37 °C) BP  Min: 127/81  Max: 141/88 Physical Exam 
General: in NAD Back: no CVAT Abdomen: Gravid, soft, nontender, no abnormal masses, rebound, rigidity, guarding Fundus: soft, nontender Cervical Exam: 7/100/-2/vertex Uterine Activity: q 3 - 4 minutes Membranes: no gross evidence of ROM Fetal Heart Rate: adequate variability and reactivity, no significant abnormal decelerations Patient Active Problem List  
Diagnosis Code  Labor and delivery, indication for care O75.9 Assessment and Plan:  
 
39 week IUP, active labor Admit L&D 
GBS negative Analgesia as indicated Plan  Signed By:  Eleuterio Weaver MD   
 2019

## 2019-01-23 PROCEDURE — 65410000002 HC RM PRIVATE OB

## 2019-01-23 NOTE — LACTATION NOTE
This note was copied from a baby's chart. Went to mother's room to offer breastfeeding assistance. Mother on phone at the time, left phone number and encouraged to call at next feeding or with any concerns.

## 2019-01-23 NOTE — PROGRESS NOTES
Post-Partum Day Number 1 Progress Note Jennifer Bermudez Assessment: Doing well, post partum day 1 Plan: 1. Continue routine postpartum and perineal care as well as maternal education. Information for the patient's :  Zeyad Quivers [[id number]] Vaginal, Spontaneous Patient doing well without significant complaint. Voiding without difficulty, normal lochia. Vitals: 
Visit Vitals /81 (BP 1 Location: Right arm, BP Patient Position: At rest) Pulse 79 Temp 98.6 °F (37 °C) Resp 16 Ht 5' 9.5\" (1.765 m) Wt 84.8 kg (187 lb) SpO2 99% Breastfeeding? Unknown BMI 27.22 kg/m² Temp (24hrs), Av.3 °F (36.8 °C), Min:98.2 °F (36.8 °C), Max:98.6 °F (37 °C) Exam:   Patient without distress. Abdomen soft, fundus firm, nontender Perineum with normal lochia noted. Lower extremities are negative for swelling, cords or tenderness. Labs:  
 
Lab Results Component Value Date/Time WBC 12.7 (H) 2019 03:02 AM  
 WBC 15.8 (H) 2017 09:20 AM  
 HGB 12.6 2019 03:02 AM  
 HGB 13.7 2017 09:20 AM  
 HCT 36.4 2019 03:02 AM  
 HCT 39.0 2017 09:20 AM  
 PLATELET 232  03:02 AM  
 PLATELET 768  09:20 AM  
 
 
No results found for this or any previous visit (from the past 24 hour(s)).

## 2019-01-23 NOTE — ROUTINE PROCESS
Bedside and Verbal shift change report given to MATT Blackman (oncoming nurse) by Cortney Chamberlain. Olivia Quiñonez, RN (offgoing nurse). Report included the following information SBAR.

## 2019-01-24 VITALS
DIASTOLIC BLOOD PRESSURE: 75 MMHG | HEART RATE: 74 BPM | HEIGHT: 70 IN | SYSTOLIC BLOOD PRESSURE: 119 MMHG | WEIGHT: 187 LBS | BODY MASS INDEX: 26.77 KG/M2 | OXYGEN SATURATION: 99 % | TEMPERATURE: 97.5 F | RESPIRATION RATE: 16 BRPM

## 2019-01-24 RX ORDER — IBUPROFEN 800 MG/1
800 TABLET ORAL
Qty: 30 TAB | Refills: 1 | Status: SHIPPED | OUTPATIENT
Start: 2019-01-24

## 2019-01-24 RX ORDER — OXYCODONE AND ACETAMINOPHEN 5; 325 MG/1; MG/1
1 TABLET ORAL
Qty: 20 TAB | Refills: 0 | Status: SHIPPED | OUTPATIENT
Start: 2019-01-24

## 2019-01-24 NOTE — LACTATION NOTE
This note was copied from a baby's chart. Couplet Interdisciplinary Rounds MATERNAL Daily Goal:  
 
Influenza screening completed: YES  Tdap screening completed: YES Rhogam Given:N/A 
MMR Given:N/A 
 
VTE Prophylaxis: Not indicated, per Provider order EPDS:   
 
 Patient Name: Emily Peralta Diagnosis:  Single liveborn infant delivered vaginally Date of Admission: 2019 LOS: 2 Gestational Age: Gestational Age: 43w3d Daily Goal:  
 
Birth Weight: 3.33 kg Current Weight: Weight: 3 kg(6lbs 9.8oz) 
% of Weight Change: -10% Feeding: 
[place] Metabolic Screen: YES Hepatitis B:  YES Discharge Bili:  YES Car Seat Trial, if needed:  N/A Patient/Family Teaching Needs:  
 
Days before discharge: Ready for discharge In Attendance:  Nursing and Physician

## 2019-01-24 NOTE — ROUTINE PROCESS
Bedside shift change report given to MATT Joshua (oncoming nurse) by MARY JANE De La Cruz RN (offgoing nurse). Report included the following information SBAR.

## 2019-01-24 NOTE — DISCHARGE INSTRUCTIONS
Vaginal Childbirth (Postpartum Care): After Your Visit     Your Care Instructions    After childbirth (postpartum period), your body goes through many changes. Some of these changes happen over several weeks. It is easy to get too tired and overwhelmed during the first weeks after childbirth. Take it easy on yourself. You may find it hard to meet the extra demands on your energy and time. Get rest whenever you can, accept help from others, and eat well and drink plenty of fluids. Your body will slowly heal in the next few weeks. You may feel emotional during this time. Changes in your hormones can shift your mood without warning. No heavy lifting. No more than 8 lbs or the size of baby for 2-3 weeks. Avoid stairs. Limit to 1-2 times per day for the 1st week after delivery. No driving for the first week after delivery. Follow-up care is a key part of your treatment and safety. Be sure to make and go to all appointments, and call your doctor if you are having problems. Its also a good idea to know your test results and keep a list of the medicines you take. Around 4 to 6 weeks after your baby's birth, you will have a follow-up visit with your doctor. This visit is your time to talk to your doctor about anything you are concerned or curious about. Keep a list of questions to bring to your postpartum visit. Your questions might be about:  Changes in your breasts, such as lumps or soreness. When to expect your menstrual period to start again. What form of birth control is best for you. Weight you have put on during the pregnancy. Exercise options. What foods and drinks are best for you, especially if you are breast-feeding. Problems you might be having with breast-feeding. When you can have sex. Some women may want to talk about lubricants for the vagina. Any feelings of sadness or restlessness that you are having. How can you care for yourself at home?   Vaginal bleeding and cramps  After delivery, you will have a bloody discharge from the vagina. This will turn pink within a week and then white or yellow after about 10 days. It may last for 2 to 4 weeks or longer, until the uterus has healed. Do not rinse inside your vagina with fluids (douche). Do not worry if you pass some blood clots, as long as they are smaller than a golf ball. If you have a tear or stitches in your vaginal area, change the pad at least every 4 hours to prevent soreness and infection. You may have cramps for the first few days after childbirth. These are normal and occur as the uterus shrinks to normal size. Take an over-the-counter pain medicine, such as acetaminophen (Tylenol), ibuprofen (Advil, Motrin), or naproxen (Aleve), for cramps. Read and follow all instructions on the label. Do not take aspirin, because it can cause more bleeding. Do not take two or more pain medicines at the same time unless the doctor told you to. Many pain medicines have acetaminophen, which is Tylenol. Too much acetaminophen (Tylenol) can be harmful. Stitches  If you have stitches, they will dissolve on their own and do not need to be removed. Follow your doctor's instructions for cleaning the stitched area. Put ice or a cold pack on your painful area for 10 to 20 minutes at a time. , several times a day, for the first few days. Put a thin cloth between the ice and your skin. Sit in a few inches of warm water (sitz bath) 3 times a day and after bowel movements. The warm water helps with pain and itching. If you do not have a tub, a warm shower might help. Keep the area clean by pouring or spraying warm water over the area outside your vagina and anus after you use the toilet. Breast fullness  Your breasts may overfill (engorge) in the first few days after delivery. To help milk flow and to relieve pain, warm your breasts in the shower or by using warm, moist towels before nursing.   If you are not nursing, do not put warmth on your breasts or touch your breasts. Wear a tight bra or sports bra and use ice until the fullness goes away. This usually takes 2 to 3 days. Put ice or a cold pack on your breast after nursing to reduce swelling and pain. Put a thin cloth between the ice and your skin. Activity  Eat a balanced diet. Do not try to lose weight by cutting calories. Keep taking your prenatal vitamins, or take a multivitamin. ·  Get help with household chores from family or friends, if you can. Do not try to do it all yourself. Get as much rest as you can. Try to take naps when your baby sleeps during the day. Get some exercise every day. But do not do any heavy exercise until your doctor says it is okay. Do not have sex or use tampons until you have stopped bleeding and at least 4 to 6 weeks have passed since you gave birth. Talk to your doctor about birth control. You can get pregnant even before your period returns. Also, you can get pregnant while you are breast-feeding. Mental health  It is normal to have some sadness, anxiety, sleeplessness, and mood swings after you go home. If you feel upset or hopeless for more than a few days or are having trouble doing the things you need to do, talk to your doctor. · Many women get the \"baby blues\" during the first few days after childbirth. The \"baby blues\" usually peak around the fourth day and then ease up in less than 2 weeks. If you have the \"baby blues\" for more than a few days, or if you have thoughts of hurting yourself or your baby, call your doctor right away. Constipation and hemorrhoids  Drink plenty of fluids, enough so that your urine is light yellow or clear like water. If you have kidney, heart, or liver disease and have to limit fluids, talk with your doctor before you increase the amount of fluids you drink. Eat plenty of fiber each day to avoid constipation.  Include foods such as whole-grain breads and cereals, raw vegetables, raw and dried fruits, and beans.  · If you have hemorrhoids or swelling or pain around the opening of your vagina, try using cold and heat. You can put ice or a cold pack on the area for 10 to 20 minutes at a time. Put a thin cloth between the ice and your skin. Also try sitting in a few inches of warm water (sitz bath) 3 times a day and after bowel movements. When should you call for help? Call 911 anytime you think you may need emergency care. For example, call if:  You are thinking of hurting yourself, your baby, or anyone else. You have sudden, severe pain in your belly. You passed out (lost consciousness). Call your doctor now or seek immediate medical care if:  You have severe vaginal bleeding. You are passing blood clots and soaking through a pad each hour for 2 or more hours. Your vaginal bleeding seems to be getting heavier or is still bright red 4 days after delivery, or you pass blood clots larger than the size of a golf ball. You are dizzy or lightheaded, or you feel like you may faint. You are vomiting or cannot keep fluids down. You have a fever. You have new or more belly pain. You pass tissue (not just blood). Your vaginal discharge smells bad. Your belly feels tender or full and hard. Your breasts are continuously painful or red. You feel sad, anxious, or hopeless for more than a few days. Watch closely for changes in your health, and be sure to contact your doctor if you have any problems. Where can you learn more? Go to [email or web address]    Enter M525  in the search box to learn more about \"Postpartum Care: After Your Visit\". or    Go to [email or web address]be    Enter Q237  in the search box to learn more about \"Vaginal Childbirth: After Your Visit\". © 4620-9398 [employer]. Care instructions adapted under license by [facility] (which disclaims liability or warranty for this information).  This care instruction is for use with your licensed healthcare professional. If you have questions about a medical condition or this instruction, always ask your healthcare professional. Steven Daniel any warranty or liability for your use of this information. Follow up with [facility] in 6 weeks; call to schedule appointment.

## 2019-01-24 NOTE — DISCHARGE SUMMARY
Obstetrical Discharge Summary     Name: Moisés Silva MRN: [id number]  SSN: [id number]    YOB: 1985  Age: 34 y.o. Sex: female      Admit Date: 2019    Discharge Date: 2019     Admitting Physician: Deysi Ochoa MD     Attending Physician:  Josefa Andrews MD     Admission Diagnoses: Contractions  Labor and delivery, indication for care    Discharge Diagnoses:   Information for the patient's :  Lydia Deep [[id number]]   Delivery of a 3.33 kg female infant via Vaginal, Spontaneous on 2019 at 3:42 AM  by . Apgars were 9 and 9. Additional Diagnoses:   Hospital Problems  Date Reviewed: 3/10/2017          Codes Class Noted POA    Labor and delivery, indication for care ICD-10-CM: O75.9  ICD-9-CM: 659.90  2019 Unknown             Lab Results   Component Value Date/Time    Rubella, External 2.54 - Immune 2018    GrBStrep, External Negative 2018       Hospital Course: Normal hospital course following the delivery. Disposition at Discharge: Home or self care    Discharged Condition: Stable    Patient Instructions:   Current Discharge Medication List      START taking these medications    Details   oxyCODONE-acetaminophen (PERCOCET) 5-325 mg per tablet Take 1 Tab by mouth every six (6) hours as needed for Pain. Max Daily Amount: 4 Tabs. Qty: 20 Tab, Refills: 0    Associated Diagnoses: Labor and delivery, indication for care         CONTINUE these medications which have CHANGED    Details   ibuprofen (MOTRIN) 800 mg tablet Take 1 Tab by mouth every eight (8) hours as needed for Pain. Qty: 30 Tab, Refills: 1         CONTINUE these medications which have NOT CHANGED    Details   PNV no.24-iron-folic acid-dha (PRENATAL DHA+COMPLETE PRENATAL) -300 mg-mcg-mg cmpk Take  by mouth. STOP taking these medications       Cetirizine 10 mg cap Comments:   Reason for Stopping:               Reference my discharge instructions.     Follow-up Appointments   Procedures    FOLLOW UP VISIT Appointment in: 6 Weeks     Standing Status:   Standing     Number of Occurrences:   1     Order Specific Question:   Appointment in     Answer:   6 Weeks        Signed By:  Alexandrea Keller MD     January 24, 2019

## 2019-01-24 NOTE — PROGRESS NOTES
Post-Partum Day Number 2 Progress Note Otilia Gutiérrez Assessment: Doing well, post partum day 2 Plan: 1. Discharge home today 2. Follow up in office in 6 weeks with Elena Templeton MD 
3. Post partum activity advised, diet as tolerated 4. Discharge Medications: ibuprofen, percocet and medications prior to admission Information for the patient's :  Romana Galvan [[id number]] Vaginal, Spontaneous Patient doing well without significant complaint. Voiding without difficulty, normal lochia. Vitals: 
Visit Vitals /75 (BP 1 Location: Right arm, BP Patient Position: At rest) Pulse 74 Temp 97.5 °F (36.4 °C) Resp 16 Ht 5' 9.5\" (1.765 m) Wt 84.8 kg (187 lb) SpO2 99% Breastfeeding? Unknown BMI 27.22 kg/m² Temp (24hrs), Av °F (36.7 °C), Min:97.5 °F (36.4 °C), Max:98.3 °F (36.8 °C) Exam:    
    Patient without distress. Abdomen soft, fundus firm, nontender Lower extremities are negative for swelling, cords or tenderness. Labs:  
 
Lab Results Component Value Date/Time WBC 12.7 (H) 2019 03:02 AM  
 WBC 15.8 (H) 2017 09:20 AM  
 HGB 12.6 2019 03:02 AM  
 HGB 13.7 2017 09:20 AM  
 HCT 36.4 2019 03:02 AM  
 HCT 39.0 2017 09:20 AM  
 PLATELET 739  03:02 AM  
 PLATELET 749  09:20 AM  
 
 
No results found for this or any previous visit (from the past 24 hour(s)).